# Patient Record
Sex: FEMALE | Race: WHITE | NOT HISPANIC OR LATINO | Employment: FULL TIME | URBAN - NONMETROPOLITAN AREA
[De-identification: names, ages, dates, MRNs, and addresses within clinical notes are randomized per-mention and may not be internally consistent; named-entity substitution may affect disease eponyms.]

---

## 2018-02-23 ENCOUNTER — DOCUMENTATION ONLY (OUTPATIENT)
Dept: FAMILY MEDICINE | Facility: OTHER | Age: 33
End: 2018-02-23

## 2018-02-23 RX ORDER — PROMETHAZINE HYDROCHLORIDE 25 MG/1
25 TABLET ORAL EVERY 6 HOURS PRN
COMMUNITY
Start: 2017-04-10

## 2018-02-23 RX ORDER — ONDANSETRON 4 MG/1
4 TABLET, ORALLY DISINTEGRATING ORAL EVERY 8 HOURS PRN
COMMUNITY
Start: 2017-03-14

## 2022-12-06 ENCOUNTER — MEDICAL CORRESPONDENCE (OUTPATIENT)
Dept: HEALTH INFORMATION MANAGEMENT | Facility: CLINIC | Age: 37
End: 2022-12-06

## 2022-12-09 ENCOUNTER — TRANSCRIBE ORDERS (OUTPATIENT)
Dept: OTHER | Age: 37
End: 2022-12-09

## 2022-12-09 DIAGNOSIS — H60.8X3 CHRONIC ECZEMATOID OTITIS EXTERNA OF BOTH EARS: Primary | ICD-10-CM

## 2023-01-28 ENCOUNTER — HEALTH MAINTENANCE LETTER (OUTPATIENT)
Age: 38
End: 2023-01-28

## 2023-02-28 NOTE — PROGRESS NOTES
CHIEF COMPLAINT: Ear Problme       HISTORY OF PRESENT ILLNESS    Yanci was seen at the behest of Eva Valladares NP for chronic otitis externa.  She has been on multiple drops in the past.  The ears get itchy and inflamed and sometimes painful to wear a mask.  She works as a  at [email or web address].   No hearing concerns.  No other ENT related concerns.          REVIEW OF SYSTEMS    Review of Systems as per HPI and PMHx, otherwise 10 system review system are negative.       ALLERGIES    Ofloxacin    CURRENT MEDICATIONS      Current Outpatient Medications:      fluocinolone acetonide oil 0.01 % ear drops, 2-3 drops to affected ear every other elkin as needed for itching, Disp: 20 mL, Rfl: 1     ondansetron (ZOFRAN-ODT) 4 MG ODT tab, Place 4 mg under the tongue every 8 hours as needed for nausea or vomiting, Disp: , Rfl:      promethazine (PHENERGAN) 25 MG tablet, Take 25 mg by mouth every 6 hours as needed for nausea or vomiting, Disp: , Rfl:      PAST MEDICAL HISTORY    PAST MEDICAL HISTORY: No past medical history on file.    PAST SURGICAL HISTORY    PAST SURGICAL HISTORY: No past surgical history on file.    FAMILY  HISTORY    FAMILY HISTORY: No family history on file.    SOCIAL HISTORY    SOCIAL HISTORY:   Social History     Tobacco Use     Smoking status: Former     Types: Cigarettes     Passive exposure: Past     Smokeless tobacco: Never   Substance Use Topics     Alcohol use: Not on file        PHYSICAL EXAM    HEAD: Normal appearance and symmetry:  No cutaneous lesions.      NECK:  supple     EARS:    Right:   External ears normal. Canals clear. TM's normal.   LEFT:  External ears normal. Canals clear. TM's normal.    EYES:  EOMI    CN VII/XII:  intact     NOSE:     Dorsum:   straight  Septum:  midline  Mucosa:  moist        ORAL CAVITY/OROPHARYNX:     Lips:  Normal.  Tongue: normal, midline  Mucosa:   no lesions     NECK:  Trachea:  midline.              Thyroid:  normal              Adenopathy:  none         NEURO:   Alert and Oriented     GAIT AND STATION:  normal     RESPIRATORY:   Symmetry and Respiratory effort     PSYCH:  Normal mood and affect     SKIN:   warm and dry         IMPRESSION:    Encounter Diagnosis   Name Primary?     Chronic eczematoid otitis externa of both ears Yes          RECOMMENDATIONS:      Orders Placed This Encounter   Procedures     BINOCULAR MICROSCOPY      Orders Placed This Encounter   Medications     fluocinolone acetonide oil 0.01 % ear drops     Si-3 drops to affected ear every other elkin as needed for itching     Dispense:  20 mL     Refill:  1     Return as needed. If not improved, will try GV

## 2023-03-01 ENCOUNTER — OFFICE VISIT (OUTPATIENT)
Dept: OTOLARYNGOLOGY | Facility: CLINIC | Age: 38
End: 2023-03-01
Payer: COMMERCIAL

## 2023-03-01 DIAGNOSIS — H60.8X3 CHRONIC ECZEMATOID OTITIS EXTERNA OF BOTH EARS: Primary | ICD-10-CM

## 2023-03-01 PROCEDURE — 92504 EAR MICROSCOPY EXAMINATION: CPT | Performed by: OTOLARYNGOLOGY

## 2023-03-01 PROCEDURE — 99203 OFFICE O/P NEW LOW 30 MIN: CPT | Mod: 25 | Performed by: OTOLARYNGOLOGY

## 2023-03-01 RX ORDER — FLUOCINOLONE ACETONIDE 0.11 MG/ML
OIL AURICULAR (OTIC)
Qty: 20 ML | Refills: 1 | Status: SHIPPED | OUTPATIENT
Start: 2023-03-01

## 2023-03-01 NOTE — LETTER
3/1/2023         RE: Yanci Powers  [address] [facility] [address]        Dear Colleague,    Thank you for referring your patient, Yanci Powers, to the [facility]. Please see a copy of my visit note below.    CHIEF COMPLAINT: Ear Problme       HISTORY OF PRESENT ILLNESS    Yanci was seen at the behest of Eva Valladares NP for chronic otitis externa.  She has been on multiple drops in the past.  The ears get itchy and inflamed and sometimes painful to wear a mask.  She works as a  at [employer].   No hearing concerns.  No other ENT related concerns.          REVIEW OF SYSTEMS    Review of Systems as per HPI and PMHx, otherwise 10 system review system are negative.       ALLERGIES    Ofloxacin    CURRENT MEDICATIONS      Current Outpatient Medications:      fluocinolone acetonide oil 0.01 % ear drops, 2-3 drops to affected ear every other elkin as needed for itching, Disp: 20 mL, Rfl: 1     ondansetron (ZOFRAN-ODT) 4 MG ODT tab, Place 4 mg under the tongue every 8 hours as needed for nausea or vomiting, Disp: , Rfl:      promethazine (PHENERGAN) 25 MG tablet, Take 25 mg by mouth every 6 hours as needed for nausea or vomiting, Disp: , Rfl:      PAST MEDICAL HISTORY    PAST MEDICAL HISTORY: No past medical history on file.    PAST SURGICAL HISTORY    PAST SURGICAL HISTORY: No past surgical history on file.    FAMILY  HISTORY    FAMILY HISTORY: No family history on file.    SOCIAL HISTORY    SOCIAL HISTORY:   Social History     Tobacco Use     Smoking status: Former     Types: Cigarettes     Passive exposure: Past     Smokeless tobacco: Never   Substance Use Topics     Alcohol use: Not on file        PHYSICAL EXAM    HEAD: Normal appearance and symmetry:  No cutaneous lesions.      NECK:  supple     EARS:    Right:   External ears normal. Canals clear. TM's normal.   LEFT:  External ears normal. Canals clear. TM's normal.    EYES:  EOMI    CN VII/XII:  intact      NOSE:     Dorsum:   straight  Septum:  midline  Mucosa:  moist        ORAL CAVITY/OROPHARYNX:     Lips:  Normal.  Tongue: normal, midline  Mucosa:   no lesions     NECK:  Trachea:  midline.              Thyroid:  normal              Adenopathy:  none        NEURO:   Alert and Oriented     GAIT AND STATION:  normal     RESPIRATORY:   Symmetry and Respiratory effort     PSYCH:  Normal mood and affect     SKIN:   warm and dry         IMPRESSION:    Encounter Diagnosis   Name Primary?     Chronic eczematoid otitis externa of both ears Yes          RECOMMENDATIONS:      Orders Placed This Encounter   Procedures     BINOCULAR MICROSCOPY      Orders Placed This Encounter   Medications     fluocinolone acetonide oil 0.01 % ear drops     Si-3 drops to affected ear every other elkin as needed for itching     Dispense:  20 mL     Refill:  1     Return as needed. If not improved, will try GV        Again, thank you for allowing me to participate in the care of your patient.        Sincerely,        Zaheer Chambers MD

## 2024-02-24 ENCOUNTER — HEALTH MAINTENANCE LETTER (OUTPATIENT)
Age: 39
End: 2024-02-24

## 2025-03-09 ENCOUNTER — HEALTH MAINTENANCE LETTER (OUTPATIENT)
Age: 40
End: 2025-03-09